# Patient Record
Sex: FEMALE | Race: AMERICAN INDIAN OR ALASKA NATIVE | NOT HISPANIC OR LATINO | Employment: PART TIME | URBAN - METROPOLITAN AREA
[De-identification: names, ages, dates, MRNs, and addresses within clinical notes are randomized per-mention and may not be internally consistent; named-entity substitution may affect disease eponyms.]

---

## 2022-12-06 ENCOUNTER — HOSPITAL ENCOUNTER (OUTPATIENT)
Dept: LAB | Facility: MEDICAL CENTER | Age: 27
End: 2022-12-06
Attending: OBSTETRICS & GYNECOLOGY
Payer: OTHER MISCELLANEOUS

## 2022-12-06 LAB
BASOPHILS # BLD AUTO: 0.5 % (ref 0–1.8)
BASOPHILS # BLD: 0.06 K/UL (ref 0–0.12)
EOSINOPHIL # BLD AUTO: 0.26 K/UL (ref 0–0.51)
EOSINOPHIL NFR BLD: 2.4 % (ref 0–6.9)
ERYTHROCYTE [DISTWIDTH] IN BLOOD BY AUTOMATED COUNT: 46.5 FL (ref 35.9–50)
GLUCOSE 1H P 50 G GLC PO SERPL-MCNC: 177 MG/DL (ref 70–139)
HCT VFR BLD AUTO: 38.5 % (ref 37–47)
HGB BLD-MCNC: 13.5 G/DL (ref 12–16)
IMM GRANULOCYTES # BLD AUTO: 0.35 K/UL (ref 0–0.11)
IMM GRANULOCYTES NFR BLD AUTO: 3.2 % (ref 0–0.9)
LYMPHOCYTES # BLD AUTO: 1.78 K/UL (ref 1–4.8)
LYMPHOCYTES NFR BLD: 16.2 % (ref 22–41)
MCH RBC QN AUTO: 33 PG (ref 27–33)
MCHC RBC AUTO-ENTMCNC: 35.1 G/DL (ref 33.6–35)
MCV RBC AUTO: 94.1 FL (ref 81.4–97.8)
MONOCYTES # BLD AUTO: 0.65 K/UL (ref 0–0.85)
MONOCYTES NFR BLD AUTO: 5.9 % (ref 0–13.4)
NEUTROPHILS # BLD AUTO: 7.89 K/UL (ref 2–7.15)
NEUTROPHILS NFR BLD: 71.8 % (ref 44–72)
NRBC # BLD AUTO: 0 K/UL
NRBC BLD-RTO: 0 /100 WBC
PLATELET # BLD AUTO: 119 K/UL (ref 164–446)
PMV BLD AUTO: 12.1 FL (ref 9–12.9)
RBC # BLD AUTO: 4.09 M/UL (ref 4.2–5.4)
T PALLIDUM AB SER QL IA: NORMAL
WBC # BLD AUTO: 11 K/UL (ref 4.8–10.8)

## 2022-12-06 PROCEDURE — 85025 COMPLETE CBC W/AUTO DIFF WBC: CPT

## 2022-12-06 PROCEDURE — 36415 COLL VENOUS BLD VENIPUNCTURE: CPT

## 2022-12-06 PROCEDURE — 82950 GLUCOSE TEST: CPT

## 2022-12-06 PROCEDURE — 86780 TREPONEMA PALLIDUM: CPT

## 2022-12-13 ENCOUNTER — HOSPITAL ENCOUNTER (OUTPATIENT)
Dept: LAB | Facility: MEDICAL CENTER | Age: 27
End: 2022-12-13
Attending: OBSTETRICS & GYNECOLOGY
Payer: OTHER MISCELLANEOUS

## 2022-12-13 LAB
BASOPHILS # BLD AUTO: 0.6 % (ref 0–1.8)
BASOPHILS # BLD: 0.07 K/UL (ref 0–0.12)
EOSINOPHIL # BLD AUTO: 0.26 K/UL (ref 0–0.51)
EOSINOPHIL NFR BLD: 2.2 % (ref 0–6.9)
ERYTHROCYTE [DISTWIDTH] IN BLOOD BY AUTOMATED COUNT: 46.9 FL (ref 35.9–50)
GLUCOSE 1H P CHAL SERPL-MCNC: 156 MG/DL (ref 65–180)
GLUCOSE 2H P CHAL SERPL-MCNC: 145 MG/DL (ref 65–155)
GLUCOSE 3H P CHAL SERPL-MCNC: 102 MG/DL (ref 65–140)
GLUCOSE BS SERPL-MCNC: 79 MG/DL (ref 65–95)
HCT VFR BLD AUTO: 41.5 % (ref 37–47)
HGB BLD-MCNC: 14.6 G/DL (ref 12–16)
IMM GRANULOCYTES # BLD AUTO: 0.18 K/UL (ref 0–0.11)
IMM GRANULOCYTES NFR BLD AUTO: 1.5 % (ref 0–0.9)
LYMPHOCYTES # BLD AUTO: 2.37 K/UL (ref 1–4.8)
LYMPHOCYTES NFR BLD: 20.1 % (ref 22–41)
MCH RBC QN AUTO: 32.7 PG (ref 27–33)
MCHC RBC AUTO-ENTMCNC: 35.2 G/DL (ref 33.6–35)
MCV RBC AUTO: 92.8 FL (ref 81.4–97.8)
MONOCYTES # BLD AUTO: 0.93 K/UL (ref 0–0.85)
MONOCYTES NFR BLD AUTO: 7.9 % (ref 0–13.4)
NEUTROPHILS # BLD AUTO: 8 K/UL (ref 2–7.15)
NEUTROPHILS NFR BLD: 67.7 % (ref 44–72)
NRBC # BLD AUTO: 0 K/UL
NRBC BLD-RTO: 0 /100 WBC
PLATELET # BLD AUTO: 122 K/UL (ref 164–446)
PMV BLD AUTO: 12.4 FL (ref 9–12.9)
RBC # BLD AUTO: 4.47 M/UL (ref 4.2–5.4)
WBC # BLD AUTO: 11.8 K/UL (ref 4.8–10.8)

## 2022-12-13 PROCEDURE — 36415 COLL VENOUS BLD VENIPUNCTURE: CPT

## 2022-12-13 PROCEDURE — 82952 GTT-ADDED SAMPLES: CPT

## 2022-12-13 PROCEDURE — 82951 GLUCOSE TOLERANCE TEST (GTT): CPT

## 2022-12-13 PROCEDURE — 85025 COMPLETE CBC W/AUTO DIFF WBC: CPT

## 2022-12-28 ENCOUNTER — HOSPITAL ENCOUNTER (EMERGENCY)
Facility: MEDICAL CENTER | Age: 27
End: 2022-12-28
Attending: OBSTETRICS & GYNECOLOGY | Admitting: OBSTETRICS & GYNECOLOGY
Payer: OTHER MISCELLANEOUS

## 2022-12-28 VITALS
SYSTOLIC BLOOD PRESSURE: 130 MMHG | TEMPERATURE: 97.3 F | HEART RATE: 93 BPM | WEIGHT: 170 LBS | HEIGHT: 68 IN | RESPIRATION RATE: 17 BRPM | OXYGEN SATURATION: 97 % | DIASTOLIC BLOOD PRESSURE: 89 MMHG | BODY MASS INDEX: 25.76 KG/M2

## 2022-12-28 PROCEDURE — 302449 STATCHG TRIAGE ONLY (STATISTIC)

## 2022-12-28 PROCEDURE — 59025 FETAL NON-STRESS TEST: CPT

## 2022-12-28 ASSESSMENT — FIBROSIS 4 INDEX: FIB4 SCORE: 1.51

## 2022-12-29 NOTE — PROGRESS NOTES
2042-Pt presents to L&D c/o ctx every 3 min for past 1 hour. Denies LOF or VB and confirms +FM. EFM and TOCO applied. VS taken. POC discussed  2045-SVE by RN (0/0/-3)  2112-Phoned Dr. Sweeney, updated on pt arrival/complaint/status, SVE and fetal tracing reviewed, discharge orders received  2120-Pt discharged home, ambulatory and undelivered. Provided general discharge instructions, term labor precautions and kick count instructions, understanding verbalized

## 2023-01-05 ENCOUNTER — APPOINTMENT (OUTPATIENT)
Dept: OBGYN | Facility: MEDICAL CENTER | Age: 28
End: 2023-01-05
Attending: OBSTETRICS & GYNECOLOGY
Payer: OTHER MISCELLANEOUS

## 2023-01-06 NOTE — H&P
ADMITTING DIAGNOSES:  1.  Intrauterine pregnancy at 39 weeks and 5 days.  2.  Gestational thrombocytopenia with platelets at 122.  3.  One-hour glucose tolerance test elevated, but 3-hour normal.  4.  History of preeclampsia.  5.  Depression, stable on sertraline.  6.  GBS is negative.     HISTORY OF PRESENT ILLNESS:  This is a 27-year-old  2, para 1 at 39   weeks and 5 days with a due date of 2023 who is scheduled for induction   of labor on 2023.  The patient has a history of gestational   thrombocytopenia.  Her platelets currently are stable at 122.  The patient   also has a history of precipitous delivery and would prefer for scheduled   induction of labor.  Risks, benefits, indications and alternatives were   discussed with the patient.  She has no unanswered questions and wants to   proceed.     PAST MEDICAL HISTORY:  1.  Depression.  2.  History of preeclampsia.  3.  Gestational thrombocytopenia.     PAST SURGICAL HISTORY:  1.  Previous right arm surgery.  2.  Tonsillectomy.     MEDICATIONS:  She is on,  1.  Aspirin 81 mg 1 p.o. daily.  2.  Prenatal vitamin 1 p.o. daily.  3.  Sertraline 100 mg 1 p.o. daily.     ALLERGIES:  No known drug allergies.     OBSTETRICAL HISTORY:  The patient is a  2, para 1.  On 3/23/2022, the   patient had a normal spontaneous vaginal delivery.  The patient had   preeclampsia and gestational thrombocytopenia.  She had a precipitous delivery   and had postpartum hemorrhage, that delivery was on 2021.  Infant   weighed 7 pounds 3 ounces.     GYNECOLOGIC HISTORY:  The patient started menstruating at age 16.  Last   menstrual cycle was 2022.  No history of STDs.  No history of HSV.     SOCIAL HISTORY:  The patient is .  She denies tobacco, alcohol or drug   use.     PHYSICAL EXAMINATION:  VITAL SIGNS:  Blood pressure 112/73.  Total weight gain 43 pounds.  GENERAL:  Pleasant female in no acute distress.  LUNGS:  Clear to auscultation  bilaterally.  CARDIOVASCULAR:  Regular rate and rhythm.  No murmur.  ABDOMEN:  Soft, gravid.  Leopold's to 7-1/2 pounds.  EXTREMITIES:  No calf tenderness.  GENITOURINARY:  Fingertip thick, and high.     PRENATAL LABORATORY DATA:  She is group B strep negative.  Hepatitis C is   negative.  Noninvasive prenatal test negative for trisomy 13, 18, 21, male   fetus.  Hepatitis B surface antigen is negative, RPR nonreactive, rubella   immune.  She is O positive, antibody screen negative, HIV nonreactive.    Chlamydia and gonorrhea negative.  The patient missed the MSAFP screen.  An   1-hour glucose elevated at 177.  RPR negative at 28 weeks.  Three-hour glucose   tolerance test, normal.  Fasting glucose 79, 1-hour 156, 2 hours 145, 3 hours   was 102.  H and H on 2022 are 14.6 and 41.5, platelets were 122.     ASSESSMENT AND PLAN:  A 27-year-old  2, para 1 at 39 weeks and 5 days   with a due date of 2023 based on a 6-week ultrasound.  1.  Term with gestational thrombocytopenia.  Platelet have been at 122, the   patient desires induction of labor.  Risks, benefits, indications and   alternatives were discussed with the patient and will start with Cytotec for   cervical ripening and then proceed with Pitocin as needed.  We will expect a   normal spontaneous vaginal delivery.  2.  GBS negative.  3.  Depression, stable on Zoloft 100 mg 1 p.o. daily.  4.  History of postpartum hemorrhage.  5.  History of precipitous delivery.  6.  One-hour glucose tolerance test elevated and 3-hour normal.  7.  History of preeclampsia.  The patient has been on aspirin 81 mg 1 p.o.   daily with normotensive blood pressures.        ______________________________  MD CEDRIC SOLIS/CHRISTI/MAURICIO    DD:  2023 09:51  DT:  2023 10:22    Job#:  [id number]

## 2023-01-09 ENCOUNTER — ANESTHESIA EVENT (OUTPATIENT)
Dept: ANESTHESIOLOGY | Facility: MEDICAL CENTER | Age: 28
End: 2023-01-09
Payer: OTHER MISCELLANEOUS

## 2023-01-09 ENCOUNTER — APPOINTMENT (OUTPATIENT)
Dept: OBGYN | Facility: MEDICAL CENTER | Age: 28
End: 2023-01-09
Attending: OBSTETRICS & GYNECOLOGY
Payer: OTHER MISCELLANEOUS

## 2023-01-09 ENCOUNTER — HOSPITAL ENCOUNTER (INPATIENT)
Facility: MEDICAL CENTER | Age: 28
LOS: 1 days | End: 2023-01-10
Attending: OBSTETRICS & GYNECOLOGY | Admitting: OBSTETRICS & GYNECOLOGY
Payer: OTHER MISCELLANEOUS

## 2023-01-09 DIAGNOSIS — Z78.9 BREASTFEEDING (INFANT): ICD-10-CM

## 2023-01-09 LAB
BASOPHILS # BLD AUTO: 0.4 % (ref 0–1.8)
BASOPHILS # BLD: 0.05 K/UL (ref 0–0.12)
EOSINOPHIL # BLD AUTO: 0.26 K/UL (ref 0–0.51)
EOSINOPHIL NFR BLD: 2.1 % (ref 0–6.9)
ERYTHROCYTE [DISTWIDTH] IN BLOOD BY AUTOMATED COUNT: 44.2 FL (ref 35.9–50)
HCT VFR BLD AUTO: 38.6 % (ref 37–47)
HGB BLD-MCNC: 14.1 G/DL (ref 12–16)
HOLDING TUBE BB 8507: NORMAL
IMM GRANULOCYTES # BLD AUTO: 0.2 K/UL (ref 0–0.11)
IMM GRANULOCYTES NFR BLD AUTO: 1.6 % (ref 0–0.9)
LYMPHOCYTES # BLD AUTO: 2.5 K/UL (ref 1–4.8)
LYMPHOCYTES NFR BLD: 20.2 % (ref 22–41)
MCH RBC QN AUTO: 33.2 PG (ref 27–33)
MCHC RBC AUTO-ENTMCNC: 36.5 G/DL (ref 33.6–35)
MCV RBC AUTO: 90.8 FL (ref 81.4–97.8)
MONOCYTES # BLD AUTO: 0.92 K/UL (ref 0–0.85)
MONOCYTES NFR BLD AUTO: 7.4 % (ref 0–13.4)
NEUTROPHILS # BLD AUTO: 8.43 K/UL (ref 2–7.15)
NEUTROPHILS NFR BLD: 68.3 % (ref 44–72)
NRBC # BLD AUTO: 0 K/UL
NRBC BLD-RTO: 0 /100 WBC
PLATELET # BLD AUTO: 127 K/UL (ref 164–446)
PMV BLD AUTO: 12.5 FL (ref 9–12.9)
RBC # BLD AUTO: 4.25 M/UL (ref 4.2–5.4)
T PALLIDUM AB SER QL IA: NORMAL
WBC # BLD AUTO: 12.4 K/UL (ref 4.8–10.8)

## 2023-01-09 PROCEDURE — 700111 HCHG RX REV CODE 636 W/ 250 OVERRIDE (IP): Performed by: OBSTETRICS & GYNECOLOGY

## 2023-01-09 PROCEDURE — 3E033VJ INTRODUCTION OF OTHER HORMONE INTO PERIPHERAL VEIN, PERCUTANEOUS APPROACH: ICD-10-PCS | Performed by: OBSTETRICS & GYNECOLOGY

## 2023-01-09 PROCEDURE — 700101 HCHG RX REV CODE 250: Performed by: ANESTHESIOLOGY

## 2023-01-09 PROCEDURE — 700105 HCHG RX REV CODE 258

## 2023-01-09 PROCEDURE — 36415 COLL VENOUS BLD VENIPUNCTURE: CPT

## 2023-01-09 PROCEDURE — 59409 OBSTETRICAL CARE: CPT

## 2023-01-09 PROCEDURE — 86780 TREPONEMA PALLIDUM: CPT

## 2023-01-09 PROCEDURE — 700102 HCHG RX REV CODE 250 W/ 637 OVERRIDE(OP): Performed by: OBSTETRICS & GYNECOLOGY

## 2023-01-09 PROCEDURE — 700111 HCHG RX REV CODE 636 W/ 250 OVERRIDE (IP)

## 2023-01-09 PROCEDURE — 01967 NEURAXL LBR ANES VAG DLVR: CPT | Performed by: ANESTHESIOLOGY

## 2023-01-09 PROCEDURE — 700101 HCHG RX REV CODE 250

## 2023-01-09 PROCEDURE — A9270 NON-COVERED ITEM OR SERVICE: HCPCS | Performed by: OBSTETRICS & GYNECOLOGY

## 2023-01-09 PROCEDURE — 0HQ9XZZ REPAIR PERINEUM SKIN, EXTERNAL APPROACH: ICD-10-PCS | Performed by: OBSTETRICS & GYNECOLOGY

## 2023-01-09 PROCEDURE — 85025 COMPLETE CBC W/AUTO DIFF WBC: CPT

## 2023-01-09 PROCEDURE — A9270 NON-COVERED ITEM OR SERVICE: HCPCS

## 2023-01-09 PROCEDURE — 303615 HCHG EPIDURAL/SPINAL ANESTHESIA FOR LABOR

## 2023-01-09 PROCEDURE — 304965 HCHG RECOVERY SERVICES

## 2023-01-09 PROCEDURE — 700111 HCHG RX REV CODE 636 W/ 250 OVERRIDE (IP): Performed by: ANESTHESIOLOGY

## 2023-01-09 PROCEDURE — 700105 HCHG RX REV CODE 258: Performed by: OBSTETRICS & GYNECOLOGY

## 2023-01-09 PROCEDURE — 770002 HCHG ROOM/CARE - OB PRIVATE (112)

## 2023-01-09 PROCEDURE — 10H07YZ INSERTION OF OTHER DEVICE INTO PRODUCTS OF CONCEPTION, VIA NATURAL OR ARTIFICIAL OPENING: ICD-10-PCS | Performed by: OBSTETRICS & GYNECOLOGY

## 2023-01-09 PROCEDURE — 700102 HCHG RX REV CODE 250 W/ 637 OVERRIDE(OP)

## 2023-01-09 RX ORDER — OXYCODONE HYDROCHLORIDE 5 MG/1
5 TABLET ORAL
Status: COMPLETED | OUTPATIENT
Start: 2023-01-09 | End: 2023-01-09

## 2023-01-09 RX ORDER — MISOPROSTOL 200 UG/1
1000 TABLET ORAL
Status: COMPLETED | OUTPATIENT
Start: 2023-01-09 | End: 2023-01-09

## 2023-01-09 RX ORDER — MISOPROSTOL 200 UG/1
TABLET ORAL
Status: COMPLETED
Start: 2023-01-09 | End: 2023-01-09

## 2023-01-09 RX ORDER — SODIUM CHLORIDE, SODIUM LACTATE, POTASSIUM CHLORIDE, AND CALCIUM CHLORIDE .6; .31; .03; .02 G/100ML; G/100ML; G/100ML; G/100ML
250 INJECTION, SOLUTION INTRAVENOUS PRN
Status: DISCONTINUED | OUTPATIENT
Start: 2023-01-09 | End: 2023-01-09 | Stop reason: HOSPADM

## 2023-01-09 RX ORDER — ACETAMINOPHEN 500 MG
1000 TABLET ORAL EVERY 6 HOURS PRN
Status: DISCONTINUED | OUTPATIENT
Start: 2023-01-09 | End: 2023-01-10 | Stop reason: HOSPADM

## 2023-01-09 RX ORDER — SERTRALINE HYDROCHLORIDE 100 MG/1
100 TABLET, FILM COATED ORAL DAILY
COMMUNITY

## 2023-01-09 RX ORDER — LIDOCAINE HYDROCHLORIDE AND EPINEPHRINE 15; 5 MG/ML; UG/ML
INJECTION, SOLUTION EPIDURAL PRN
Status: DISCONTINUED | OUTPATIENT
Start: 2023-01-09 | End: 2023-01-09 | Stop reason: SURG

## 2023-01-09 RX ORDER — ACETAMINOPHEN 500 MG
1000 TABLET ORAL
Status: COMPLETED | OUTPATIENT
Start: 2023-01-09 | End: 2023-01-09

## 2023-01-09 RX ORDER — TRANEXAMIC ACID 100 MG/ML
INJECTION, SOLUTION INTRAVENOUS
Status: COMPLETED
Start: 2023-01-09 | End: 2023-01-09

## 2023-01-09 RX ORDER — ONDANSETRON 2 MG/ML
4 INJECTION INTRAMUSCULAR; INTRAVENOUS EVERY 6 HOURS PRN
Status: DISCONTINUED | OUTPATIENT
Start: 2023-01-09 | End: 2023-01-09 | Stop reason: HOSPADM

## 2023-01-09 RX ORDER — SODIUM CHLORIDE, SODIUM LACTATE, POTASSIUM CHLORIDE, CALCIUM CHLORIDE 600; 310; 30; 20 MG/100ML; MG/100ML; MG/100ML; MG/100ML
INJECTION, SOLUTION INTRAVENOUS PRN
Status: DISCONTINUED | OUTPATIENT
Start: 2023-01-09 | End: 2023-01-10 | Stop reason: HOSPADM

## 2023-01-09 RX ORDER — LIDOCAINE HYDROCHLORIDE 10 MG/ML
20 INJECTION, SOLUTION INFILTRATION; PERINEURAL
Status: DISCONTINUED | OUTPATIENT
Start: 2023-01-09 | End: 2023-01-09 | Stop reason: HOSPADM

## 2023-01-09 RX ORDER — ROPIVACAINE HYDROCHLORIDE 2 MG/ML
INJECTION, SOLUTION EPIDURAL; INFILTRATION PRN
Status: DISCONTINUED | OUTPATIENT
Start: 2023-01-09 | End: 2023-01-09 | Stop reason: SURG

## 2023-01-09 RX ORDER — METHYLERGONOVINE MALEATE 0.2 MG/ML
0.2 INJECTION INTRAVENOUS
Status: DISCONTINUED | OUTPATIENT
Start: 2023-01-09 | End: 2023-01-10 | Stop reason: HOSPADM

## 2023-01-09 RX ORDER — IBUPROFEN 800 MG/1
800 TABLET ORAL
Status: DISCONTINUED | OUTPATIENT
Start: 2023-01-09 | End: 2023-01-09 | Stop reason: HOSPADM

## 2023-01-09 RX ORDER — MISOPROSTOL 200 UG/1
800 TABLET ORAL
Status: DISCONTINUED | OUTPATIENT
Start: 2023-01-09 | End: 2023-01-09

## 2023-01-09 RX ORDER — SODIUM CHLORIDE, SODIUM LACTATE, POTASSIUM CHLORIDE, CALCIUM CHLORIDE 600; 310; 30; 20 MG/100ML; MG/100ML; MG/100ML; MG/100ML
INJECTION, SOLUTION INTRAVENOUS CONTINUOUS
Status: DISCONTINUED | OUTPATIENT
Start: 2023-01-09 | End: 2023-01-10 | Stop reason: HOSPADM

## 2023-01-09 RX ORDER — DOCUSATE SODIUM 100 MG/1
100 CAPSULE, LIQUID FILLED ORAL 2 TIMES DAILY PRN
Status: DISCONTINUED | OUTPATIENT
Start: 2023-01-09 | End: 2023-01-10 | Stop reason: HOSPADM

## 2023-01-09 RX ORDER — SERTRALINE HYDROCHLORIDE 100 MG/1
100 TABLET, FILM COATED ORAL DAILY
Status: DISCONTINUED | OUTPATIENT
Start: 2023-01-10 | End: 2023-01-10 | Stop reason: HOSPADM

## 2023-01-09 RX ORDER — ROPIVACAINE HYDROCHLORIDE 2 MG/ML
INJECTION, SOLUTION EPIDURAL; INFILTRATION; PERINEURAL
Status: COMPLETED
Start: 2023-01-09 | End: 2023-01-09

## 2023-01-09 RX ORDER — MISOPROSTOL 200 UG/1
600 TABLET ORAL
Status: DISCONTINUED | OUTPATIENT
Start: 2023-01-09 | End: 2023-01-10 | Stop reason: HOSPADM

## 2023-01-09 RX ORDER — OXYTOCIN 10 [USP'U]/ML
10 INJECTION, SOLUTION INTRAMUSCULAR; INTRAVENOUS
Status: DISCONTINUED | OUTPATIENT
Start: 2023-01-09 | End: 2023-01-09 | Stop reason: HOSPADM

## 2023-01-09 RX ORDER — CITRIC ACID/SODIUM CITRATE 334-500MG
30 SOLUTION, ORAL ORAL EVERY 6 HOURS PRN
Status: DISCONTINUED | OUTPATIENT
Start: 2023-01-09 | End: 2023-01-09 | Stop reason: HOSPADM

## 2023-01-09 RX ORDER — SODIUM CHLORIDE, SODIUM LACTATE, POTASSIUM CHLORIDE, AND CALCIUM CHLORIDE .6; .31; .03; .02 G/100ML; G/100ML; G/100ML; G/100ML
1000 INJECTION, SOLUTION INTRAVENOUS
Status: DISCONTINUED | OUTPATIENT
Start: 2023-01-09 | End: 2023-01-09 | Stop reason: HOSPADM

## 2023-01-09 RX ORDER — IBUPROFEN 800 MG/1
800 TABLET ORAL EVERY 8 HOURS PRN
Status: DISCONTINUED | OUTPATIENT
Start: 2023-01-09 | End: 2023-01-10 | Stop reason: HOSPADM

## 2023-01-09 RX ORDER — TERBUTALINE SULFATE 1 MG/ML
0.25 INJECTION, SOLUTION SUBCUTANEOUS
Status: DISCONTINUED | OUTPATIENT
Start: 2023-01-09 | End: 2023-01-09 | Stop reason: HOSPADM

## 2023-01-09 RX ORDER — CARBOPROST TROMETHAMINE 250 UG/ML
250 INJECTION, SOLUTION INTRAMUSCULAR
Status: DISCONTINUED | OUTPATIENT
Start: 2023-01-09 | End: 2023-01-09 | Stop reason: HOSPADM

## 2023-01-09 RX ORDER — ONDANSETRON 4 MG/1
4 TABLET, ORALLY DISINTEGRATING ORAL EVERY 6 HOURS PRN
Status: DISCONTINUED | OUTPATIENT
Start: 2023-01-09 | End: 2023-01-09 | Stop reason: HOSPADM

## 2023-01-09 RX ORDER — METHYLERGONOVINE MALEATE 0.2 MG/ML
0.2 INJECTION INTRAVENOUS
Status: COMPLETED | OUTPATIENT
Start: 2023-01-09 | End: 2023-01-09

## 2023-01-09 RX ORDER — ROPIVACAINE HYDROCHLORIDE 2 MG/ML
INJECTION, SOLUTION EPIDURAL; INFILTRATION; PERINEURAL CONTINUOUS
Status: DISCONTINUED | OUTPATIENT
Start: 2023-01-09 | End: 2023-01-10 | Stop reason: HOSPADM

## 2023-01-09 RX ORDER — SODIUM CHLORIDE 9 MG/ML
INJECTION, SOLUTION INTRAVENOUS
Status: COMPLETED
Start: 2023-01-09 | End: 2023-01-09

## 2023-01-09 RX ORDER — OXYCODONE HYDROCHLORIDE 5 MG/1
5 TABLET ORAL EVERY 4 HOURS PRN
Status: DISCONTINUED | OUTPATIENT
Start: 2023-01-09 | End: 2023-01-10 | Stop reason: HOSPADM

## 2023-01-09 RX ORDER — CARBOPROST TROMETHAMINE 250 UG/ML
250 INJECTION, SOLUTION INTRAMUSCULAR
Status: DISCONTINUED | OUTPATIENT
Start: 2023-01-09 | End: 2023-01-10 | Stop reason: HOSPADM

## 2023-01-09 RX ADMIN — SODIUM CHLORIDE, POTASSIUM CHLORIDE, SODIUM LACTATE AND CALCIUM CHLORIDE: 600; 310; 30; 20 INJECTION, SOLUTION INTRAVENOUS at 09:33

## 2023-01-09 RX ADMIN — METHYLERGONOVINE MALEATE 0.2 MG: 0.2 INJECTION, SOLUTION INTRAMUSCULAR; INTRAVENOUS at 17:39

## 2023-01-09 RX ADMIN — LIDOCAINE HYDROCHLORIDE,EPINEPHRINE BITARTRATE 5 ML: 15; .005 INJECTION, SOLUTION EPIDURAL; INFILTRATION; INTRACAUDAL; PERINEURAL at 09:19

## 2023-01-09 RX ADMIN — ROPIVACAINE HYDROCHLORIDE: 2 INJECTION, SOLUTION EPIDURAL; INFILTRATION at 09:24

## 2023-01-09 RX ADMIN — SODIUM CHLORIDE, POTASSIUM CHLORIDE, SODIUM LACTATE AND CALCIUM CHLORIDE: 600; 310; 30; 20 INJECTION, SOLUTION INTRAVENOUS at 20:42

## 2023-01-09 RX ADMIN — IBUPROFEN 800 MG: 800 TABLET, FILM COATED ORAL at 23:14

## 2023-01-09 RX ADMIN — MISOPROSTOL 1000 MCG: 200 TABLET ORAL at 17:39

## 2023-01-09 RX ADMIN — OXYCODONE 5 MG: 5 TABLET ORAL at 19:28

## 2023-01-09 RX ADMIN — OXYTOCIN 20 UNITS: 10 INJECTION, SOLUTION INTRAMUSCULAR; INTRAVENOUS at 17:03

## 2023-01-09 RX ADMIN — OXYTOCIN 125 ML/HR: 10 INJECTION, SOLUTION INTRAMUSCULAR; INTRAVENOUS at 18:25

## 2023-01-09 RX ADMIN — TRANEXAMIC ACID 1000 MG: 100 INJECTION, SOLUTION INTRAVENOUS at 18:27

## 2023-01-09 RX ADMIN — ACETAMINOPHEN 1000 MG: 500 TABLET ORAL at 23:14

## 2023-01-09 RX ADMIN — ROPIVACAINE HYDROCHLORIDE: 2 INJECTION, SOLUTION EPIDURAL; INFILTRATION at 16:25

## 2023-01-09 RX ADMIN — ACETAMINOPHEN 1000 MG: 500 TABLET ORAL at 19:28

## 2023-01-09 RX ADMIN — SODIUM CHLORIDE, POTASSIUM CHLORIDE, SODIUM LACTATE AND CALCIUM CHLORIDE: 600; 310; 30; 20 INJECTION, SOLUTION INTRAVENOUS at 06:06

## 2023-01-09 RX ADMIN — ONDANSETRON 4 MG: 2 INJECTION INTRAMUSCULAR; INTRAVENOUS at 16:46

## 2023-01-09 RX ADMIN — SODIUM CHLORIDE, POTASSIUM CHLORIDE, SODIUM LACTATE AND CALCIUM CHLORIDE: 600; 310; 30; 20 INJECTION, SOLUTION INTRAVENOUS at 14:25

## 2023-01-09 RX ADMIN — OXYTOCIN 1 MILLI-UNITS/MIN: 10 INJECTION, SOLUTION INTRAMUSCULAR; INTRAVENOUS at 06:07

## 2023-01-09 RX ADMIN — SODIUM CHLORIDE 100 ML: 900 INJECTION INTRAVENOUS at 18:28

## 2023-01-09 RX ADMIN — ROPIVACAINE HYDROCHLORIDE 10 ML: 5 INJECTION, SOLUTION EPIDURAL; INFILTRATION; PERINEURAL at 09:22

## 2023-01-09 ASSESSMENT — PATIENT HEALTH QUESTIONNAIRE - PHQ9
1. LITTLE INTEREST OR PLEASURE IN DOING THINGS: NOT AT ALL
SUM OF ALL RESPONSES TO PHQ9 QUESTIONS 1 AND 2: 0
2. FEELING DOWN, DEPRESSED, IRRITABLE, OR HOPELESS: NOT AT ALL

## 2023-01-09 ASSESSMENT — FIBROSIS 4 INDEX: FIB4 SCORE: 1.51

## 2023-01-09 ASSESSMENT — PAIN DESCRIPTION - PAIN TYPE
TYPE: ACUTE PAIN

## 2023-01-09 ASSESSMENT — PAIN SCALES - GENERAL: PAIN_LEVEL: 0

## 2023-01-09 ASSESSMENT — LIFESTYLE VARIABLES: EVER_SMOKED: NEVER

## 2023-01-09 NOTE — PROGRESS NOTES
Labor Progress Note    Noris Leung   39w5d/gestational thrombocytopenia      Subjective:  Pt comfortable with epidural.  Uterine contractions:yes  Pain: No    Objective:   Vitals:    23 0925 23 0930 23 0935 23 0940   BP: 122/76 129/75 120/76 126/83   Pulse: 77 (!) 102 87 94   Resp: 16 16 16 16   Temp:       TempSrc:       SpO2: 98% 96%     Weight:       Height:         FHT: 120's category 1  [place]: q 2  SVE: 2/50/-1, arom, forebag, clear, IUPC placed    Membranes ruptured: .yes, clear    Meds:   Epidural : .yes  Pitocin: .yes, 14 mu    Labs:  Recent Results (from the past 24 hour(s))   CBC WITH DIFFERENTIAL    Collection Time: 23  5:30 AM   Result Value Ref Range    WBC 12.4 (H) 4.8 - 10.8 K/uL    RBC 4.25 4.20 - 5.40 M/uL    Hemoglobin 14.1 12.0 - 16.0 g/dL    Hematocrit 38.6 37.0 - 47.0 %    MCV 90.8 81.4 - 97.8 fL    MCH 33.2 (H) 27.0 - 33.0 pg    MCHC 36.5 (H) 33.6 - 35.0 g/dL    RDW 44.2 35.9 - 50.0 fL    Platelet Count 127 (L) 164 - 446 K/uL    MPV 12.5 9.0 - 12.9 fL    Neutrophils-Polys 68.30 44.00 - 72.00 %    Lymphocytes 20.20 (L) 22.00 - 41.00 %    Monocytes 7.40 0.00 - 13.40 %    Eosinophils 2.10 0.00 - 6.90 %    Basophils 0.40 0.00 - 1.80 %    Immature Granulocytes 1.60 (H) 0.00 - 0.90 %    Nucleated RBC 0.00 /100 WBC    Neutrophils (Absolute) 8.43 (H) 2.00 - 7.15 K/uL    Lymphs (Absolute) 2.50 1.00 - 4.80 K/uL    Monos (Absolute) 0.92 (H) 0.00 - 0.85 K/uL    Eos (Absolute) 0.26 0.00 - 0.51 K/uL    Baso (Absolute) 0.05 0.00 - 0.12 K/uL    Immature Granulocytes (abs) 0.20 (H) 0.00 - 0.11 K/uL    NRBC (Absolute) 0.00 K/uL   HOLD BLOOD BANK SPECIMEN (NOT TESTED)    Collection Time: 23  5:30 AM   Result Value Ref Range    Holding Tube - Bb DONE    T.PALLIDUM AB DANISH (SCREENING)    Collection Time: 23  5:30 AM   Result Value Ref Range    Syphilis, Treponemal Qual Non-Reactive Non-Reactive       Assessment:   39w5d/iOL-pt progressing, cpm. Expt .  GBBS  negative  Fetal status-reassuring  Gestational thrombocytopenia.        Miroslava Montejo M.D.

## 2023-01-09 NOTE — PROGRESS NOTES
"Labor Progress Note    Noriskalli Leung   39w5d      Subjective:  Uterine contractions:yes  Pain: Yes. Severity: mild    Objective:   Vitals:    23 0500 23 0600 23 0700   BP:  132/87 128/83   Pulse:  (!) 109 74   Resp:   16   Temp:  36.4 °C (97.5 °F) 36.2 °C (97.2 °F)   TempSrc:  Temporal Temporal   SpO2:   95%   Weight: 79.4 kg (175 lb)     Height: 1.702 m (5' 7\")       Fetal heart variability: 120's category 1  [place]:Irregular  SVE: /high    Membranes ruptured: .no    Meds:   Epidural : .no  Pitocin: .yes, 5 mu    Labs:  Recent Results (from the past 24 hour(s))   CBC WITH DIFFERENTIAL    Collection Time: 23  5:30 AM   Result Value Ref Range    WBC 12.4 (H) 4.8 - 10.8 K/uL    RBC 4.25 4.20 - 5.40 M/uL    Hemoglobin 14.1 12.0 - 16.0 g/dL    Hematocrit 38.6 37.0 - 47.0 %    MCV 90.8 81.4 - 97.8 fL    MCH 33.2 (H) 27.0 - 33.0 pg    MCHC 36.5 (H) 33.6 - 35.0 g/dL    RDW 44.2 35.9 - 50.0 fL    Platelet Count 127 (L) 164 - 446 K/uL    MPV 12.5 9.0 - 12.9 fL    Neutrophils-Polys 68.30 44.00 - 72.00 %    Lymphocytes 20.20 (L) 22.00 - 41.00 %    Monocytes 7.40 0.00 - 13.40 %    Eosinophils 2.10 0.00 - 6.90 %    Basophils 0.40 0.00 - 1.80 %    Immature Granulocytes 1.60 (H) 0.00 - 0.90 %    Nucleated RBC 0.00 /100 WBC    Neutrophils (Absolute) 8.43 (H) 2.00 - 7.15 K/uL    Lymphs (Absolute) 2.50 1.00 - 4.80 K/uL    Monos (Absolute) 0.92 (H) 0.00 - 0.85 K/uL    Eos (Absolute) 0.26 0.00 - 0.51 K/uL    Baso (Absolute) 0.05 0.00 - 0.12 K/uL    Immature Granulocytes (abs) 0.20 (H) 0.00 - 0.11 K/uL    NRBC (Absolute) 0.00 K/uL   HOLD BLOOD BANK SPECIMEN (NOT TESTED)    Collection Time: 23  5:30 AM   Result Value Ref Range    Holding Tube - Bb DONE    T.PALLIDUM AB DANISH (SCREENING)    Collection Time: 23  5:30 AM   Result Value Ref Range    Syphilis, Treponemal Qual Non-Reactive Non-Reactive       Assessment:   39w5d/IOL-pt on Pitocin. Expt .  GBBS negative  Fetal " status-reassuring  Gestational thrombocytopenia-stable platelets at 127.   Depression-stable on Zoloft.         Miroslava Montejo M.D.

## 2023-01-09 NOTE — PROGRESS NOTES
"0700 - Report received from MANAN Pimentel at BS. Pt VS stable. Reports mild discomfort with contractions. FOB \"Daryl\" at BS for support. POC discussed. Pt has no new questions or concerns at this time.   0835 - Pt reports possible SROM. This RN observed clear/scant fluid. SVE 1-2/th/-4. Pt requesting epidural at this time.  0913 - Dr. Glover at BS to place epidural.  0921 - Pt tolerated test dose well. Epidural in place.  1254 - Report received from MANAN Purvis. Dr. Montejo at BS, AROM forebag with clear fluid. SVE 2/50/-1. IUPC inserted.  1658 - Pt delivered viable baby boy. APGARs 8/9. . 1st degree perineal laceration repaired by Dr. Montejo.  1816 - Dr. Montejo to BS to assess pt bleeding. Orders received. See MAR.  1850 - Pt up to bathroom and voided. Peribottle and pads provided.  1900 - Report given to Ann HINKLE. Pt has no new questions at this time.  "

## 2023-01-09 NOTE — ANESTHESIA PROCEDURE NOTES
Epidural Block    Date/Time: 1/9/2023 9:14 AM  Performed by: Miguel Glover M.D.  Authorized by: Miguel Glover M.D.     Patient Location:  OB  Start Time:  1/9/2023 9:14 AM  End Time:  1/9/2023 9:19 AM  Reason for Block: labor analgesia    patient identified, IV checked, site marked, risks and benefits discussed, surgical consent, monitors and equipment checked, pre-op evaluation and timeout performed    Patient Position:  Sitting  Prep: ChloraPrep, patient draped and sterile technique    Monitoring:  Blood pressure, continuous pulse oximetry and heart rate  Approach:  Midline  Location:  L3-L4  Injection Technique:  LUIS EDUARDO saline  Skin infiltration:  Lidocaine  Strength:  1%  Dose:  2ml  Needle Type:  Tuohy  Needle Gauge:  17 G  Needle Length:  3.5 in  Loss of resistance::  4  Catheter Size:  19 G  Catheter at Skin Depth:  10  Test Dose Result:  Negative

## 2023-01-09 NOTE — PROGRESS NOTES
0515: pt  here at 39w5d for IOL related to gestational thrombocytopenia. Pt reports irregular ctx and +FM. She denies LOF, VB, HA or RUQ pain. VSS. SVE /high. Admission orders initiated and POC discussed  0700: report given to MANAN Roman

## 2023-01-09 NOTE — CARE PLAN
The patient is Watcher - Medium risk of patient condition declining or worsening    Shift Goals  Clinical Goals: have a healthy baby  Patient Goals: pain management  Family Goals: emotional support      Problem: Knowledge Deficit - L&D  Goal: Patient and family/caregivers will demonstrate understanding of plan of care, disease process/condition, diagnostic tests and medications  Outcome: Progressing   POC discussed with pt. Pt verbalizes understanding and asks questions as needed.     Problem: Risk for Bleeding  Goal: Patient will take measures to prevent bleeding and recognizes signs of bleeding that need to be reported immediately to a health care professional  Outcome: Progressing   Pt lab results in Epic. Low platelet count discussed with anesthesia and providers. Pt educated on risks of bleeding d/t low platelet levels and will report any abnormal bleeding/bruising.    Problem: Pain  Goal: Patient's pain will be alleviated or reduced to the patient’s comfort goal  Outcome: Progressing   Pain management options discussed with pt. Pt able to verbalize a pain rating on pain scale. Pt will ask for intervention as needed.

## 2023-01-09 NOTE — ANESTHESIA PREPROCEDURE EVALUATION
Date: 01/09/23  Procedure: Labor Epidural         Relevant Problems   No relevant active problems   pregnancy, labor pain    Physical Exam    Airway   Mallampati: II  TM distance: >3 FB  Neck ROM: full       Cardiovascular - normal exam  Rhythm: regular  Rate: normal  (-) murmur     Dental - normal exam           Pulmonary - normal exam  Breath sounds clear to auscultation     Abdominal    Neurological - normal exam                 Anesthesia Plan    ASA 2       Plan - epidural   Neuraxial block will be labor analgesia                  Pertinent diagnostic labs and testing reviewed    Informed Consent:    Anesthetic plan and risks discussed with patient.

## 2023-01-10 VITALS
OXYGEN SATURATION: 96 % | BODY MASS INDEX: 27.47 KG/M2 | WEIGHT: 175 LBS | HEART RATE: 88 BPM | RESPIRATION RATE: 20 BRPM | HEIGHT: 67 IN | DIASTOLIC BLOOD PRESSURE: 86 MMHG | TEMPERATURE: 97.5 F | SYSTOLIC BLOOD PRESSURE: 125 MMHG

## 2023-01-10 LAB
ERYTHROCYTE [DISTWIDTH] IN BLOOD BY AUTOMATED COUNT: 45.9 FL (ref 35.9–50)
HCT VFR BLD AUTO: 36 % (ref 37–47)
HGB BLD-MCNC: 12.9 G/DL (ref 12–16)
MCH RBC QN AUTO: 32.8 PG (ref 27–33)
MCHC RBC AUTO-ENTMCNC: 35.8 G/DL (ref 33.6–35)
MCV RBC AUTO: 91.6 FL (ref 81.4–97.8)
PLATELET # BLD AUTO: 105 K/UL (ref 164–446)
PMV BLD AUTO: 12.7 FL (ref 9–12.9)
RBC # BLD AUTO: 3.93 M/UL (ref 4.2–5.4)
WBC # BLD AUTO: 15 K/UL (ref 4.8–10.8)

## 2023-01-10 PROCEDURE — 36415 COLL VENOUS BLD VENIPUNCTURE: CPT

## 2023-01-10 PROCEDURE — A9270 NON-COVERED ITEM OR SERVICE: HCPCS | Performed by: OBSTETRICS & GYNECOLOGY

## 2023-01-10 PROCEDURE — 700102 HCHG RX REV CODE 250 W/ 637 OVERRIDE(OP): Performed by: OBSTETRICS & GYNECOLOGY

## 2023-01-10 PROCEDURE — 85027 COMPLETE CBC AUTOMATED: CPT

## 2023-01-10 RX ADMIN — SERTRALINE 100 MG: 100 TABLET, FILM COATED ORAL at 05:46

## 2023-01-10 RX ADMIN — ACETAMINOPHEN 1000 MG: 500 TABLET ORAL at 12:28

## 2023-01-10 RX ADMIN — IBUPROFEN 800 MG: 800 TABLET, FILM COATED ORAL at 05:46

## 2023-01-10 RX ADMIN — ACETAMINOPHEN 1000 MG: 500 TABLET ORAL at 05:46

## 2023-01-10 ASSESSMENT — EDINBURGH POSTNATAL DEPRESSION SCALE (EPDS)
I HAVE BLAMED MYSELF UNNECESSARILY WHEN THINGS WENT WRONG: NOT VERY OFTEN
I HAVE FELT SCARED OR PANICKY FOR NO GOOD REASON: NO, NOT MUCH
I HAVE BEEN SO UNHAPPY THAT I HAVE BEEN CRYING: NO, NEVER
THE THOUGHT OF HARMING MYSELF HAS OCCURRED TO ME: NEVER
I HAVE BEEN ABLE TO LAUGH AND SEE THE FUNNY SIDE OF THINGS: AS MUCH AS I ALWAYS COULD
I HAVE BEEN ANXIOUS OR WORRIED FOR NO GOOD REASON: HARDLY EVER
I HAVE FELT SAD OR MISERABLE: NO, NOT AT ALL
I HAVE BEEN SO UNHAPPY THAT I HAVE HAD DIFFICULTY SLEEPING: NOT AT ALL
THINGS HAVE BEEN GETTING ON TOP OF ME: NO, MOST OF THE TIME I HAVE COPED QUITE WELL
I HAVE LOOKED FORWARD WITH ENJOYMENT TO THINGS: AS MUCH AS I EVER DID

## 2023-01-10 ASSESSMENT — PAIN DESCRIPTION - PAIN TYPE: TYPE: ACUTE PAIN

## 2023-01-10 NOTE — LACTATION NOTE
This note was copied from a baby's chart.  Mom is a 26 y/o P2 who delivered baby boy weighing 8# 15.at 403 wks. Mom reports darker and enlarged areolas during pregnancy. Mom denies any breast surgeries, diabetes, thyroid or fertility issues. Mom breast fed her first child for 6 months then got Covid and her supply decreased.   Mom states this baby is nursing well with swallows being heard. Mom has no concerns at this time. LC reviewed basic breast feeding education. Mo has no questions for LC . FOB at bedside and supportive.  Mom does have a pump at home for personal use.   Breastfeeding plan:    Feed baby with feeding cues and at least a minimum of 8x/24 hours.  Expect cluster feeding as this is normal.   It is not recommended to let baby sleep longer than 3 hours between feedings and if sleepy, place skin to skin to promote feeding interest and milk production.  Baby's usually feed more frequently and longer when skin to skin with mother. It is not recommended to use pacifiers or supplementing with formula until breast feeding and milk production is well established or at least 3-4 weeks.    Make sure infant is getting enough by ensuring frequent feedings as well as looking for transitioning stools from dark meconium to bright yellow/green seedy loose stools by day 5.   Referral will be sent to  [facility].

## 2023-01-10 NOTE — DISCHARGE SUMMARY
Discharge Summary:      Noris Leung    Admit Date:   2023  Discharge Date:  1/10/2023     Admitting diagnosis:  Labor and delivery indication for care or intervention [O75.9]  Discharge Diagnosis: Status post vaginal, spontaneous.  Pregnancy Complications:   1.  Intrauterine pregnancy at 39 weeks and 5 days.  2.  Gestational thrombocytopenia with platelets at 122.  3.  One-hour glucose tolerance test elevated, but 3-hour normal.  4.  History of preeclampsia.  5.  Depression, stable on sertraline.  6.  GBS is negative.     History:  History reviewed. No pertinent past medical history.  OB History    Para Term  AB Living   2 2 2     2   SAB IAB Ectopic Molar Multiple Live Births           0 2      # Outcome Date GA Lbr Guille/2nd Weight Sex Delivery Anes PTL Lv   2 Term 23 39w5d  4.055 kg (8 lb 15 oz) M Vag-Spont EPI N NGA   1 Term 21 38w0d   F  EPI N NGA        Patient has no known allergies.  There are no problems to display for this patient.       Hospital Course:   27 y.o. , now para 2, was admitted with the above mentioned diagnosis, underwent Induction of Labor, for gestational thrombocytopenia. Pt progressed to complete and had a vaginal, spontaneous delivery.  Patient postpartum course was unremarkable, with progressive advancement in diet , ambulation and toleration of oral analgesia. Patient without complaints today and desires discharge.      Vitals:    23 1745 23 2050 01/10/23 0200 01/10/23 0600   BP: 126/71 121/80 111/75 113/70   Pulse: 74 91 90 79   Resp: 16 17 17 17   Temp: 36.5 °C (97.7 °F) 37.2 °C (99 °F) 36.7 °C (98.1 °F) 36.6 °C (97.8 °F)   TempSrc: Temporal Temporal Temporal Temporal   SpO2:  95% 96% 96%   Weight:       Height:           Current Facility-Administered Medications   Medication Dose    LR infusion      oxytocin (PITOCIN) infusion (for post delivery)  125 mL/hr    oxytocin (PITOCIN) infusion (for induction)  0.5-20  dash-units/min    ropivacaine 0.2 % (NAROPIN) injection      lactated ringers infusion      docusate sodium (COLACE) capsule 100 mg  100 mg    acetaminophen (TYLENOL) tablet 1,000 mg  1,000 mg    tetanus-dipth-acell pertussis (Tdap) inj 0.5 mL  0.5 mL    measles, mumps and rubella vaccine (MMR) injection 0.5 mL  0.5 mL    PRN oxytocin (PITOCIN) (20 Units/1000 mL) PRN for excessive uterine bleeding - See Admin Instr  125-999 mL/hr    miSOPROStol (CYTOTEC) tablet 600 mcg  600 mcg    methylergonovine (METHERGINE) injection 0.2 mg  0.2 mg    carboPROST (HEMABATE) injection 250 mcg  250 mcg    oxyCODONE immediate-release (ROXICODONE) tablet 5 mg  5 mg    sertraline (Zoloft) tablet 100 mg  100 mg    ibuprofen (MOTRIN) tablet 800 mg  800 mg       Exam:  Gen: NAD  Breast Exam: negative  Abdomen: Abdomen soft, non-tender. BS normal. No masses,  No organomegaly  Fundus Non Tender: no  Extremity: extremities, peripheral pulses and reflexes normal, no edema, redness or tenderness in the calves or thighs     Labs:  Recent Labs     01/09/23  0530 01/10/23  0535   WBC 12.4* 15.0*   RBC 4.25 3.93*   HEMOGLOBIN 14.1 12.9   HEMATOCRIT 38.6 36.0*   MCV 90.8 91.6   MCH 33.2* 32.8   MCHC 36.5* 35.8*   RDW 44.2 45.9   PLATELETCT 127* 105*   MPV 12.5 12.7        Activity:   Discharge to home  Pelvic Rest x 6 weeks    Assessment:  normal postpartum course  Discharge Assessment: No areas of skin breakdown/redness     Follow up: 6 weeks for vaginal delivery exam with Dr Montejo    Discharge Meds:   No current outpatient medications on file.   OTC tylenol.  Zoloft 100 mg one po qd.      Miroslava Montejo M.D.

## 2023-01-10 NOTE — PROGRESS NOTES
1900- Bedside report received from Jessie STEWARD, POC discussed, care assumed with pt in stable condition.

## 2023-01-10 NOTE — ANESTHESIA TIME REPORT
Anesthesia Start and Stop Event Times     Date Time Event    1/9/2023 0910 Ready for Procedure     0914 Anesthesia Start     1658 Anesthesia Stop        Responsible Staff  01/09/23    Name Role Begin End    Miguel Glover M.D. Anesth 0914 1658        Overtime Reason:  no overtime (within assigned shift)    Comments:

## 2023-01-10 NOTE — L&D DELIVERY NOTE
DATE OF SERVICE:  2023     ADMITTING DIAGNOSES:   1.  Intrauterine pregnancy at 39 weeks and 5 days.  2.  Gestational thrombocytopenia with platelets of 127.  3.  One-hour glucose tolerance test elevated, by 3-hour normal.  4.  History of preeclampsia.  5.  Depression, stable on sertraline 100 mg 1 p.o. every day.  6.  GBS is negative.     PROCEDURES:   1.  Admission to labor and delivery.  2.  Pitocin up to 14 milliunits.  3.  Normal spontaneous vaginal delivery of a vigorous male with Apgars 8 and 9   and postpartum procedure was repair of first-degree midline laceration.     DESCRIPTION OF PROCEDURE:  This patient is a 27-year-old  2, para 1   that was admitted at 39 weeks and 5 days for induction of labor secondary to   gestational thrombocytopenia with platelets of 127.  When patient arrived, she   was 1 cm dilated, 50% effaced, -3 station.  She was started on Pitocin.    Fetal status was reassuring.  She was a group B strep negative; therefore, no   antibiotics were given.  The patient had spontaneous rupture of membranes at   8:35 a.m.  At that time, she was 1-2 cm dilated.  She had an epidural for pain   control and progressed through labor without any problems.  I checked her at   12:48 p.m. and at that time, a forebag was palpated and it was ruptured with   clear fluid.  An IUPC was placed.  Fetal status remained reassuring and the   patient remained afebrile.  She was then changed to 9 cm, completely effaced   and 0 station.  She was complete at 4:36 p.m.  She then started pushing.  At   4:58 I assisted with a normal spontaneous vaginal delivery of a vigorous male   in BRANDI position.  The head was delivered atraumatically and the rest of the   infant delivered without any problems.  Mouth and nose were bulb suctioned.    Infant placed on maternal abdomen.  Delayed cord clamping was performed for 2   minutes and then the cord was doubly clamped and cut by the infant's father.    Cord gases were  clamped and set aside.  The placenta was then delivered intact   at 5:03 p.m.  Three-vessel cord was noted.  Uterus is firm with an EBL of   300.  She did have a first-degree midline laceration that was repaired with a   2-0 Vicryl on a CT1 needle without any problems.  Mom and baby are doing well.    She did not receive any antibiotics since she was GBS negative.  Again, this   was a vigorous male with Apgars 8 and 9.  .  Infant's weight is   pending.        ______________________________  MD CEDRIC SOLIS/FAMILIA/BARRON    DD:  01/09/2023 17:24  DT:  01/09/2023 21:17    Job#:  [id number]

## 2023-01-10 NOTE — CARE PLAN
The patient is Stable - Low risk of patient condition declining or worsening    Shift Goals  Clinical Goals: have a healthy baby  Patient Goals: pain management  Family Goals: emotional support    Progress made toward(s) clinical / shift goals:     Problem: Altered Physiologic Condition  Goal: Patient physiologically stable as evidenced by normal lochia, palpable uterine involution and vitals within normal limits  Outcome: Progressing  Note: Fundus firm, lochia light, ambulating.      Problem: Infection - Postpartum  Goal: Postpartum patient will be free of signs and symptoms of infection  Outcome: Progressing  Note: Patient remains free from signs and symptoms of infection, vital signs stable.

## 2023-01-10 NOTE — PROGRESS NOTES
Admitted patient from [facility] on Labor and Delivery, oriented patient to room, including call light, emergency call light, television, bed controls,and lights. Plan of care discussed and patient verbalized understanding. Assessment completed, fundus firm and lochia light. Abdominal incision with mepilex silver dressing intact. Patient will call if PRN pain medication intervention needed.

## 2023-01-10 NOTE — DISCHARGE PLANNING
Discharge Planning Assessment Post Partum    Reason for Referral: History of anxiety  Address: [address] [place] [address] [place] [address]  Phone: [phone]  Type of Living Situation: living with FOB and daughter  Mom Diagnosis: Pregnancy  Baby Diagnosis: -39.5 weeks  Primary Language: English    Father of the Baby: Daryl Ham  Involved in baby’s care? Yes  Contact Information: [phone]    Prenatal Care: Yes  Mom's PCP: No PCP listed   PCP for new baby: Pediatrician list provided     Support System: FOB and family   Coping/Bonding between mother & baby: Yes  Source of Feeding: breast feeding  Supplies for Infant: prepared for infant; denies any needs    Mom's Insurance: Miscellaneous  Baby Covered on Insurance:Yes  Other children in the home/names & ages: daughter-age 21 months    Financial Hardship/Income: No   Mom's Mental status: alert and oriented  Services used prior to admit: None    CPS History: No  Psychiatric History: history of anxiety.  Provided MOB with a list of counseling and support group resources  Domestic Violence History: No  Drug/ETOH History: No    Resources Provided: pediatrician list, children and family resource list, post partum support and counseling resources, and a list of [facility] clinics provided to parents   Referrals Made: diaper bank referral provided     Clearance for Discharge: Infant is cleared to discharge home with parents once medically cleared

## 2023-01-10 NOTE — L&D DELIVERY NOTE
Delivery note   , male in BRANDI position with apgars 8 and 9. Mouth and nose bulb suctioned and infant placed on maternal abdomen. Delayed cord x 2 min and then cord doubly clamped and cut by infant's father. Placenta delivered intact, 3 vc. Firm fundus, ebl: 300. 1st degree midline laceration repaired with 2-0 vicryl on ct-1 needle. Mom and baby doing well.

## 2023-01-10 NOTE — ANESTHESIA POSTPROCEDURE EVALUATION
Patient: Noris Leung    Procedure Summary     Date: 01/09/23 Room / Location:     Anesthesia Start: 0914 Anesthesia Stop: 1658    Procedure: Labor Epidural Diagnosis:     Scheduled Providers:  Responsible Provider: Miguel Glover M.D.    Anesthesia Type: epidural ASA Status: 2          Final Anesthesia Type: epidural  Last vitals  BP   Blood Pressure: 126/71    Temp   36.5 °C (97.7 °F)    Pulse   74   Resp   16    SpO2   96 %      Anesthesia Post Evaluation    Patient location during evaluation: PACU  Patient participation: complete - patient participated  Level of consciousness: awake and alert  Pain score: 0    Airway patency: patent  Anesthetic complications: no  Cardiovascular status: hemodynamically stable  Respiratory status: acceptable  Hydration status: euvolemic    PONV: none          No notable events documented.

## 2023-01-10 NOTE — CARE PLAN
The patient is Stable - Low risk of patient condition declining or worsening    Shift Goals  Clinical Goals: maintain stable vitals    Problem: Altered Physiologic Condition  Goal: Patient physiologically stable as evidenced by normal lochia, palpable uterine involution and vitals within normal limits  Outcome: Progressing  Note: Patient vitals stable. Scant lochia, firm fundus.      Problem: Infection - Postpartum  Goal: Postpartum patient will be free of signs and symptoms of infection  Outcome: Progressing  Note: Patient vitals stable. No signs or symptoms of infection noted or reported.

## 2023-01-10 NOTE — PROGRESS NOTES
Assumed care. Assessment completed. Fundus firm, lochia scant. Plan of Care reviewed. Denies pain at this time. Will call if pain intervention needed.

## 2023-01-11 NOTE — PROGRESS NOTES
1800 Discharge instructions reviewed. Verbalized understanding. Papers signed. Prescribed meds and information given and reviewed.     1822 Mom and baby left facility escorted by staff.

## 2023-01-11 NOTE — DISCHARGE INSTRUCTIONS
PATIENT DISCHARGE EDUCATION INSTRUCTION SHEET    REASONS TO CALL YOUR OBSTETRICIAN  Persistent fever, shaking, chills (Temperature higher than 100.4) may indicate you have an infection  Heavy bleeding: soaking more than 1 pad per hour; Passing clots an egg-sized clot or bigger may mean you have an postpartum hemorrhage  Foul odor from vagina or bad smelling or discolored discharge or blood  Breast infection (Mastitis symptoms); breast pain, chills, fever, redness or red streaks, may feel flu like symptoms  Urinary pain, burning or frequency  Incision that is not healing, increased redness, swelling, tenderness or pain, or any pus from episiotomy or  site may mean you have an infection  Redness, swelling, warmth, or painful to touch in the calf area of your leg may mean you have a blood clot  Severe or intensified depression, thoughts or feelings of wanting to hurt yourself or someone else   Pain in chest, obstructed breathing or shortness of breath (trouble catching your breath) may mean you are having a postpartum complication. Call your provider immediately   Headache that does not get better, even after taking medicine, a bad headache with vision changes or pain in the upper right area of your belly may mean you have high blood pressure or post birth preeclampsia. Call your provider immediately    HAND WASHING  All family and friends should wash their hands:  Before and after holding the baby  Before feeding the baby  After using the restroom or changing the baby's diaper    WOUND CARE  Ask your physician for additional care instructions. In general:  May use donnie-spray bottle, witch hazel pads and dermaplast spray for comfort  Use donnie-spray bottle after urinating to cleanse perineal area  To prevent burning during urination spray donnie-water bottle on labial area   Pat perineal area dry until episiotomy/laceration is healed  Continue to use donnie-bottle until bleeding stops as needed  If have a 2nd  degree laceration or greater, a Sitz bath can offer relief from soreness, burning, and inflammation   Sitz Bath   Sit in 6 inches of warm water and soak laceration as needed until the laceration heals    VAGINAL CARE AND BLEEDING  Nothing inside vagina for 6 weeks:   No sexual intercourse, tampons or douching  Bleeding may continue for 2-4 weeks. Amount and color may vary  Soaking 1 pad or more in an hour for several hours is considered heavy bleeding  Passing large egg sized blood clots can be concerning  If you feel like you have heavy bleeding or are having increasing amount of blood clots call your Obstetrician immediately  If you begin feeling faint upon standing, feeling sick to your stomach, have clammy skin, a really fast heartbeat, have chills, start feeling confused, dizzy, sleepy or weak, or feeling like you're going to faint call your Obstetrician immediately    HYPERTENSION   Preeclampsia or gestational hypertension are types of high blood pressure that only pregnant women can get. It is important for you to be aware of symptoms to seek early intervention and treatment. If you have any of these symptoms immediately call your Obstetrician    Vision changes or blurred vision   Severe headache or pain that is unrelieved with medication and will not go away  Persistent pain in upper abdomen or shoulder   Increased swelling of face, feet, or hands  Difficulty breathing or shortness of breath at rest  Urinating less than usual    URINATION AND BOWEL MOVEMENTS  Eating more fiber (bran cereal, fruits, and vegetables) and drinking plenty of fluids will help to avoid constipation  Urinary frequency and urgency after childbirth is normal  If you experience any urinary pain, burning or frequency call your provider    BIRTH CONTROL  It is possible to become pregnant at any time after delivery and while breastfeeding  Plan to discuss a method of birth control with your physician at your post delivery follow up  "visit    POSTPARTUM BLUES  During the first few days after birth, you may experience a sense of the \"blues\" which may include impatience, irritability or even crying. These feelings come and go quickly. However, as many as 1 in 10 women experience emotional symptoms known as postpartum depression.     POSTPARTUM DEPRESSION    May start as early as the second or third day after delivery or take several weeks or months to develop. Symptoms of \"blues\" are present, but are more intense: Crying spells; loss of appetite; feelings of hopelessness or loss of control; fear of touching the baby; over concern or no concern at all about the baby; little or no concern about your own appearance/caring for yourself; and/or inability to sleep or excessive sleeping. Contact your Obstetrician if you are experiencing any of these symptoms     PREVENTING SHAKEN BABY  If you are angry or stressed, PUT THE BABY IN THE CRIB, step away, take some deep breaths, and wait until you are calm to care for the baby. DO NOT SHAKE THE BABY. You are not alone, call a supporter for help.  [facility] 24/7 crisis call line (635-848-3858) or (1-307.204.1871)  You can also text them, text \"ANSWER\" (819113)  "

## 2024-07-07 ENCOUNTER — OFFICE VISIT (OUTPATIENT)
Dept: URGENT CARE | Facility: PHYSICIAN GROUP | Age: 29
End: 2024-07-07
Payer: COMMERCIAL

## 2024-07-07 ENCOUNTER — APPOINTMENT (OUTPATIENT)
Dept: RADIOLOGY | Facility: IMAGING CENTER | Age: 29
End: 2024-07-07
Payer: COMMERCIAL

## 2024-07-07 VITALS
WEIGHT: 148 LBS | HEART RATE: 82 BPM | SYSTOLIC BLOOD PRESSURE: 100 MMHG | TEMPERATURE: 98.3 F | DIASTOLIC BLOOD PRESSURE: 72 MMHG | RESPIRATION RATE: 16 BRPM | HEIGHT: 67 IN | BODY MASS INDEX: 23.23 KG/M2 | OXYGEN SATURATION: 97 %

## 2024-07-07 DIAGNOSIS — S93.401A SPRAIN OF RIGHT ANKLE, UNSPECIFIED LIGAMENT, INITIAL ENCOUNTER: ICD-10-CM

## 2024-07-07 PROCEDURE — 3078F DIAST BP <80 MM HG: CPT

## 2024-07-07 PROCEDURE — 3074F SYST BP LT 130 MM HG: CPT

## 2024-07-07 PROCEDURE — 73630 X-RAY EXAM OF FOOT: CPT | Mod: TC,RT | Performed by: RADIOLOGY

## 2024-07-07 PROCEDURE — 99203 OFFICE O/P NEW LOW 30 MIN: CPT

## 2024-07-07 PROCEDURE — 73610 X-RAY EXAM OF ANKLE: CPT | Mod: TC,RT | Performed by: PHYSICIAN ASSISTANT

## 2024-07-07 RX ORDER — SULFAMETHOXAZOLE AND TRIMETHOPRIM 800; 160 MG/1; MG/1
TABLET ORAL
COMMUNITY
Start: 2024-06-03 | End: 2024-07-07

## 2024-07-07 RX ORDER — NITROFURANTOIN 25; 75 MG/1; MG/1
100 CAPSULE ORAL 2 TIMES DAILY
COMMUNITY
Start: 2024-05-29 | End: 2024-06-03

## 2024-07-07 ASSESSMENT — ENCOUNTER SYMPTOMS
WEAKNESS: 0
FALLS: 1
TINGLING: 0
LOSS OF CONSCIOUSNESS: 0

## 2024-07-11 ENCOUNTER — OFFICE VISIT (OUTPATIENT)
Dept: SPORTS MEDICINE | Facility: OTHER | Age: 29
End: 2024-07-11
Payer: COMMERCIAL

## 2024-07-11 VITALS
HEIGHT: 67 IN | WEIGHT: 148 LBS | BODY MASS INDEX: 23.23 KG/M2 | HEART RATE: 78 BPM | OXYGEN SATURATION: 97 % | TEMPERATURE: 98.3 F | DIASTOLIC BLOOD PRESSURE: 76 MMHG | SYSTOLIC BLOOD PRESSURE: 116 MMHG | RESPIRATION RATE: 16 BRPM
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
## 2024-07-11 DIAGNOSIS — S93.401A MODERATE ANKLE SPRAIN, RIGHT, INITIAL ENCOUNTER: ICD-10-CM

## 2024-07-11 PROCEDURE — 3078F DIAST BP <80 MM HG: CPT | Performed by: FAMILY MEDICINE

## 2024-07-11 PROCEDURE — 3074F SYST BP LT 130 MM HG: CPT | Performed by: FAMILY MEDICINE

## 2024-07-11 PROCEDURE — 99214 OFFICE O/P EST MOD 30 MIN: CPT | Performed by: FAMILY MEDICINE

## 2024-07-11 ASSESSMENT — ENCOUNTER SYMPTOMS
FEVER: 0
VOMITING: 0
DIZZINESS: 0
SHORTNESS OF BREATH: 0
NAUSEA: 0
CHILLS: 0

## 2024-07-18 ENCOUNTER — OFFICE VISIT (OUTPATIENT)
Dept: SPORTS MEDICINE | Facility: OTHER | Age: 29
End: 2024-07-18
Payer: COMMERCIAL

## 2024-07-18 VITALS
DIASTOLIC BLOOD PRESSURE: 68 MMHG | SYSTOLIC BLOOD PRESSURE: 98 MMHG | BODY MASS INDEX: 23.23 KG/M2 | HEIGHT: 67 IN | OXYGEN SATURATION: 93 % | TEMPERATURE: 98 F | WEIGHT: 148 LBS | HEART RATE: 68 BPM

## 2024-07-18 DIAGNOSIS — S93.401A MODERATE ANKLE SPRAIN, RIGHT, INITIAL ENCOUNTER: ICD-10-CM
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
## 2024-07-18 PROCEDURE — 3078F DIAST BP <80 MM HG: CPT | Performed by: FAMILY MEDICINE

## 2024-07-18 PROCEDURE — 99213 OFFICE O/P EST LOW 20 MIN: CPT | Performed by: FAMILY MEDICINE

## 2024-07-18 PROCEDURE — 3074F SYST BP LT 130 MM HG: CPT | Performed by: FAMILY MEDICINE

## 2024-08-01 ENCOUNTER — APPOINTMENT (OUTPATIENT)
Dept: SPORTS MEDICINE | Facility: OTHER | Age: 29
End: 2024-08-01
Payer: COMMERCIAL

## 2024-11-03 ENCOUNTER — OFFICE VISIT (OUTPATIENT)
Dept: URGENT CARE | Facility: PHYSICIAN GROUP | Age: 29
End: 2024-11-03
Payer: COMMERCIAL

## 2024-11-03 ENCOUNTER — APPOINTMENT (OUTPATIENT)
Dept: RADIOLOGY | Facility: IMAGING CENTER | Age: 29
End: 2024-11-03
Attending: NURSE PRACTITIONER
Payer: COMMERCIAL

## 2024-11-03 VITALS
DIASTOLIC BLOOD PRESSURE: 68 MMHG | WEIGHT: 152 LBS | OXYGEN SATURATION: 96 % | RESPIRATION RATE: 18 BRPM | HEIGHT: 68 IN | SYSTOLIC BLOOD PRESSURE: 90 MMHG | HEART RATE: 62 BPM | BODY MASS INDEX: 23.04 KG/M2 | TEMPERATURE: 97.9 F

## 2024-11-03 DIAGNOSIS — M54.42 ACUTE BILATERAL LOW BACK PAIN WITH LEFT-SIDED SCIATICA: ICD-10-CM

## 2024-11-03 PROCEDURE — 3074F SYST BP LT 130 MM HG: CPT | Performed by: NURSE PRACTITIONER

## 2024-11-03 PROCEDURE — 72100 X-RAY EXAM L-S SPINE 2/3 VWS: CPT | Mod: TC | Performed by: NURSE PRACTITIONER

## 2024-11-03 PROCEDURE — 3078F DIAST BP <80 MM HG: CPT | Performed by: NURSE PRACTITIONER

## 2024-11-03 PROCEDURE — 99214 OFFICE O/P EST MOD 30 MIN: CPT | Performed by: NURSE PRACTITIONER

## 2024-11-03 RX ORDER — CYCLOBENZAPRINE HCL 5 MG
5-10 TABLET ORAL EVERY 8 HOURS PRN
Qty: 30 TABLET | Refills: 0 | Status: SHIPPED | OUTPATIENT
Start: 2024-11-03

## 2024-11-03 RX ORDER — NITROFURANTOIN 25; 75 MG/1; MG/1
CAPSULE ORAL
COMMUNITY
Start: 2024-08-30 | End: 2024-11-03

## 2024-11-03 ASSESSMENT — ENCOUNTER SYMPTOMS
CONSTITUTIONAL NEGATIVE: 1
GASTROINTESTINAL NEGATIVE: 1
CHILLS: 0
SENSORY CHANGE: 0
BOWEL INCONTINENCE: 0
NEUROLOGICAL NEGATIVE: 1
BACK PAIN: 1
PERIANAL NUMBNESS: 0
ABDOMINAL PAIN: 0
WEAKNESS: 0

## 2024-11-03 ASSESSMENT — VISUAL ACUITY: OU: 1
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
